# Patient Record
Sex: MALE | Race: WHITE
[De-identification: names, ages, dates, MRNs, and addresses within clinical notes are randomized per-mention and may not be internally consistent; named-entity substitution may affect disease eponyms.]

---

## 2018-01-04 ENCOUNTER — HOSPITAL ENCOUNTER (EMERGENCY)
Dept: HOSPITAL 45 - C.EDB | Age: 43
LOS: 1 days | Discharge: HOME | End: 2018-01-05
Payer: COMMERCIAL

## 2018-01-04 VITALS
WEIGHT: 246.48 LBS | WEIGHT: 246.48 LBS | BODY MASS INDEX: 35.29 KG/M2 | TEMPERATURE: 98.42 F | BODY MASS INDEX: 35.29 KG/M2 | OXYGEN SATURATION: 94 % | HEIGHT: 70 IN | HEIGHT: 70 IN

## 2018-01-04 DIAGNOSIS — R55: Primary | ICD-10-CM

## 2018-01-04 DIAGNOSIS — Z79.899: ICD-10-CM

## 2018-01-05 VITALS — SYSTOLIC BLOOD PRESSURE: 124 MMHG | HEART RATE: 74 BPM | DIASTOLIC BLOOD PRESSURE: 74 MMHG | OXYGEN SATURATION: 94 %

## 2018-01-05 LAB
ALBUMIN SERPL-MCNC: 4.2 GM/DL (ref 3.4–5)
ALP SERPL-CCNC: 111 U/L (ref 45–117)
ALT SERPL-CCNC: 62 U/L (ref 12–78)
AST SERPL-CCNC: 26 U/L (ref 15–37)
BASOPHILS # BLD: 0.03 K/UL (ref 0–0.2)
BASOPHILS NFR BLD: 0.2 %
BUN SERPL-MCNC: 15 MG/DL (ref 7–18)
CALCIUM SERPL-MCNC: 9.1 MG/DL (ref 8.5–10.1)
CO2 SERPL-SCNC: 25 MMOL/L (ref 21–32)
CREAT SERPL-MCNC: 1.38 MG/DL (ref 0.6–1.4)
EOS ABS #: 0.15 K/UL (ref 0–0.5)
EOSINOPHIL NFR BLD AUTO: 290 K/UL (ref 130–400)
GLUCOSE SERPL-MCNC: 148 MG/DL (ref 70–99)
HCT VFR BLD CALC: 44 % (ref 42–52)
HGB BLD-MCNC: 15.2 G/DL (ref 14–18)
IG#: 0.06 K/UL (ref 0–0.02)
IMM GRANULOCYTES NFR BLD AUTO: 16.4 %
LYMPHOCYTES # BLD: 2.26 K/UL (ref 1.2–3.4)
MCH RBC QN AUTO: 30.5 PG (ref 25–34)
MCHC RBC AUTO-ENTMCNC: 34.5 G/DL (ref 32–36)
MCV RBC AUTO: 88.2 FL (ref 80–100)
MONO ABS #: 1.25 K/UL (ref 0.11–0.59)
MONOCYTES NFR BLD: 9.1 %
NEUT ABS #: 10 K/UL (ref 1.4–6.5)
NEUTROPHILS # BLD AUTO: 1.1 %
NEUTROPHILS NFR BLD AUTO: 72.8 %
PMV BLD AUTO: 9.6 FL (ref 7.4–10.4)
POTASSIUM SERPL-SCNC: 3.8 MMOL/L (ref 3.5–5.1)
PROT SERPL-MCNC: 7.9 GM/DL (ref 6.4–8.2)
RED CELL DISTRIBUTION WIDTH CV: 13.2 % (ref 11.5–14.5)
RED CELL DISTRIBUTION WIDTH SD: 42.7 FL (ref 36.4–46.3)
SODIUM SERPL-SCNC: 135 MMOL/L (ref 136–145)
WBC # BLD AUTO: 13.75 K/UL (ref 4.8–10.8)

## 2018-01-05 NOTE — EMERGENCY ROOM VISIT NOTE
History


First contact with patient:  23:37


Chief Complaint:  SYNCOPE


Stated Complaint:  SEIZURE


Nursing Triage Summary:  


Pt arrived via [facility] from home. Pt reports he started Lisinopril 20mg today 


and took his first dose at 5pm. Pt was taken off of medication in October and 


started back on medication today due to high blood pressure at PCP today. 


Tonight pt had near syncope episode while on the toilet. Felt warm, diaphoretic

, 


pale per report. Pt laid down flat and symptoms resolved. Pt's mother (a nurse) 


arrived at the pt's residence and pt sat upright in chair. Mother assessed 

blood 


pressure which read 94/62. Pt again became pale, diaphoretic, and felt "ill and 


weak". Pt had syncopal episode and family lowered pt to floor where he was 


unconscious for approximately 2min. Pt's mother shook pt and he woke alert and 


oriented. EMS arrived to find pt alert and oriented. IV access obtained in LAC. 


No medications enroute.





History of Present Illness


The patient is a 42 year old male who presents to the Emergency Room via EMS 

with complaints of a syncopal episode.  The patient reports that he was sitting 

on the toilet and began to feel clammy and lightheaded and felt like he was 

going to pass out.  The patient felt better when he laid down.  His wife called 

his mother, who lives near them and is a nurse.  She reports that she took his 

blood pressure and it was 94/62.  She states the patient was very pale.  He 

then sat up in a chair when he began to feel better, however he then developed 

feelings of clamminess and lightheadedness and had a syncopal episode.  The 

patient's mother reports that she shook him and said his name, and when he did 

not respond she immediately started performing chest compressions.  She 

performed 1 compression and the patient sat up and asked what she was doing.  

The patient was alert immediately afterward.  He reports that he feels fine now 

and denies any complaints.  He was started on lisinopril today, but states that 

he did take this a few months ago and had no problems with it.  He has been 

eating and drinking normally.  He denies any recent illness.  He denies any 

chest pain or shortness of breath associated with the episode.  There was no 

seizure-like activity.  The patient denies any history of syncopal episodes.  

He denies any medical problems other than hypertension.





Review of Systems


A complete 10 point review of systems was reviewed with the patient with 

pertinent positives and negatives as per history of present illness. All else 

were negative.





Social History


Smoking Status:  Never Smoker


Marital Status:  


Housing Status:  lives with family





Current/Historical Medications


Scheduled


Lisinopril (Prinivil), 20 MG PO DAILY





Physical Exam


Vital Signs











  Date Time  Temp Pulse Resp B/P (MAP) Pulse Ox O2 Delivery O2 Flow Rate FiO2


 


1/5/18 01:41  74 16 124/74 94 Room Air  


 


1/5/18 01:31    108/67    


 


1/5/18 01:19    133/84    


 


1/5/18 01:17    123/84    


 


1/5/18 01:16    124/69    


 


1/5/18 01:15  71 18 124/69 93 Room Air  





  80  123/84    





  85  133/84    


 


1/5/18 01:11  81 17  94   


 


1/5/18 01:01    125/76    


 


1/5/18 00:41  84 17  92   


 


1/5/18 00:31    132/74    


 


1/5/18 00:11  79 15  95   


 


1/5/18 00:06  78 16  95   


 


1/5/18 00:01    127/70    


 


1/4/18 23:51  86 18  93   


 


1/4/18 23:35  87      


 


1/4/18 23:31    144/78    


 


1/4/18 23:24    143/89    


 


1/4/18 23:21 36.9 80 18 144/78 93 Room Air  


 


1/4/18 23:21     94 Room Air  











Physical Exam


VITALS: Vitals are noted on the nurse's note and reviewed by myself.  Vital 

signs stable.


GENERAL: This is a 42-year-old male, in no acute distress, nondiaphoretic, well-

developed well-nourished.


SKIN: The skin was without rashes, erythema, edema, or bruising. 


HEAD: Normocephalic atraumatic.  


EARS: External auditory canals clear, tympanic membranes pearly gray without 

erythema or effusion bilaterally.


EYES: Pupils equal round and reactive to light and accommodation.  Extraocular 

movements intact.  


MOUTH: Mucous membranes moist.  Tonsils are not enlarged.  Pharynx without 

erythema or exudate.


NECK: Supple without nuchal rigidity.  No lymphadenopathy.  


HEART: Regular rate and rhythm without murmurs gallops or rubs.


LUNGS: Clear to auscultation bilaterally without wheezes, rales or rhonchi.  


NEURO: Patient was alert and oriented to person place and time.





Medical Decision & Procedures


ER Provider


Diagnostic Interpretation:


CHEST 1 VIEW:





No acute cardiopulmonary disease.





Laboratory Results


1/4/18 23:25








Red Blood Count 4.99, Mean Corpuscular Volume 88.2, Mean Corpuscular Hemoglobin 

30.5, Mean Corpuscular Hemoglobin Concent 34.5, Mean Platelet Volume 9.6, 

Neutrophils (%) (Auto) 72.8, Lymphocytes (%) (Auto) 16.4, Monocytes (%) (Auto) 

9.1, Eosinophils (%) (Auto) 1.1, Basophils (%) (Auto) 0.2, Neutrophils # (Auto) 

10.00, Lymphocytes # (Auto) 2.26, Monocytes # (Auto) 1.25, Eosinophils # (Auto) 

0.15, Basophils # (Auto) 0.03





1/4/18 23:25

















Test


  1/4/18


00:50 1/4/18


23:25


 


Urine Color YELLOW  


 


Urine Appearance CLEAR (CLEAR)  


 


Urine pH 6.5 (4.5-7.5)  


 


Urine Specific Gravity


  1.012


(1.000-1.030) 


 


 


Urine Protein TRACE (NEG)  


 


Urine Glucose (UA) NEG (NEG)  


 


Urine Ketones NEG (NEG)  


 


Urine Occult Blood NEG (NEG)  


 


Urine Nitrite NEG (NEG)  


 


Urine Bilirubin NEG (NEG)  


 


Urine Urobilinogen NEG (NEG)  


 


Urine Leukocyte Esterase NEG (NEG)  


 


Urine WBC (Auto) 1-5 /hpf (0-5)  


 


Urine RBC (Auto) 0-4 /hpf (0-4)  


 


Urine Hyaline Casts (Auto)


  5-10 /lpf


(0-5) 


 


 


Urine Epithelial Cells (Auto)


  20-30 /lpf


(0-5) 


 


 


Urine Bacteria (Auto) NEG (NEG)  


 


White Blood Count


  


  13.75 K/uL


(4.8-10.8)


 


Red Blood Count


  


  4.99 M/uL


(4.7-6.1)


 


Hemoglobin


  


  15.2 g/dL


(14.0-18.0)


 


Hematocrit  44.0 % (42-52) 


 


Mean Corpuscular Volume


  


  88.2 fL


()


 


Mean Corpuscular Hemoglobin


  


  30.5 pg


(25-34)


 


Mean Corpuscular Hemoglobin


Concent 


  34.5 g/dl


(32-36)


 


Platelet Count


  


  290 K/uL


(130-400)


 


Mean Platelet Volume


  


  9.6 fL


(7.4-10.4)


 


Neutrophils (%) (Auto)  72.8 % 


 


Lymphocytes (%) (Auto)  16.4 % 


 


Monocytes (%) (Auto)  9.1 % 


 


Eosinophils (%) (Auto)  1.1 % 


 


Basophils (%) (Auto)  0.2 % 


 


Neutrophils # (Auto)


  


  10.00 K/uL


(1.4-6.5)


 


Lymphocytes # (Auto)


  


  2.26 K/uL


(1.2-3.4)


 


Monocytes # (Auto)


  


  1.25 K/uL


(0.11-0.59)


 


Eosinophils # (Auto)


  


  0.15 K/uL


(0-0.5)


 


Basophils # (Auto)


  


  0.03 K/uL


(0-0.2)


 


RDW Standard Deviation


  


  42.7 fL


(36.4-46.3)


 


RDW Coefficient of Variation


  


  13.2 %


(11.5-14.5)


 


Immature Granulocyte % (Auto)  0.4 % 


 


Immature Granulocyte # (Auto)


  


  0.06 K/uL


(0.00-0.02)


 


Anion Gap


  


  9.0 mmol/L


(3-11)


 


Est Creatinine Clear Calc


Drug Dose 


  88.2 ml/min 


 


 


Estimated GFR (


American) 


  73.1 


 


 


Estimated GFR (Non-


American 


  63.1 


 


 


BUN/Creatinine Ratio  11.0 (10-20) 


 


Calcium Level


  


  9.1 mg/dl


(8.5-10.1)


 


Total Bilirubin


  


  0.7 mg/dl


(0.2-1)


 


Aspartate Amino Transf


(AST/SGOT) 


  26 U/L (15-37) 


 


 


Alanine Aminotransferase


(ALT/SGPT) 


  62 U/L (12-78) 


 


 


Alkaline Phosphatase


  


  111 U/L


()


 


Troponin I


  


  < 0.015 ng/ml


(0-0.045)


 


Total Protein


  


  7.9 gm/dl


(6.4-8.2)


 


Albumin


  


  4.2 gm/dl


(3.4-5.0)


 


Globulin


  


  3.7 gm/dl


(2.5-4.0)


 


Albumin/Globulin Ratio  1.1 (0.9-2) 


 


Thyroid Stimulating Hormone


(TSH) 


  9.560 uIu/ml


(0.300-4.500)











ECG


Indication:  syncope


Rate (beats per minute):  78


Rhythm:  normal sinus


Findings:  no acute ischemic change, no ectopy


Comparison ECG Date:  no prior available





Medical Decision


Differential diagnosis includes vasovagal syncope, cardiac arrhythmia, seizure, 

dehydration, among others.





The patient is a 42-year-old male who presents today complaining of with a 

syncopal episode.  Patient is asymptomatic at this time.  Labs revealed a 

leukocytosis possibly due to stress.  TSH is elevated, patient may follow up 

with his primary care provider regarding this.  EKG was unremarkable.  Troponin 

was not elevated.  Orthostatics were not positive.  Patient likely experienced 

a vasovagal syncopal episode.  Will follow-up with his primary care provider 

for further evaluation.





The patient's case was reviewed with Dr. Acosta, ED attending physician, who 

agreed with my assessment and treatment plan.





Based on the patient's presentation and work up, I feel the patient is stable 

for outpatient treatment.  The patient was educated to return to the emergency 

department for any worsening of their current condition or new/concerning 

symptoms.  He will follow up with his PCP.





Medication Reconcilliation


Current Medication List:  was personally reviewed by me





Blood Pressure Screening


Patient's blood pressure:  Normal blood pressure





Impression





 Primary Impression:  


 Syncope





Departure Information


Dispostion


Home / Self-Care





Condition


GOOD





Referrals


Jesu Martin PA-C (PCP)





Patient Instructions


My [facility]





Additional Instructions





Rest and make sure to drink plenty of fluids at home.





Contact your primary care provider tomorrow to schedule a follow-up appointment 

and discuss the lisinopril.





Your TSH was elevated today.  Follow up with your PCP regarding this.





Return to the emergency department with any chest pain, shortness of breath, 

recurrent episodes of passing out or other new/concerning symptoms.





Problem Qualifiers








 Primary Impression:  


 Syncope


 Syncope type:  vasovagal syncope  Qualified Codes:  R55 - Syncope and collapse

## 2018-01-05 NOTE — DIAGNOSTIC IMAGING REPORT
CHEST ONE VIEW PORTABLE



CLINICAL HISTORY: syncope dyspnea



COMPARISON STUDY:  No previous studies for comparison.



FINDINGS: The bones soft tissues and hemidiaphragms are normal. The

cardiomediastinal silhouette is normal. The lungs are clear. The pulmonary

vasculature is normal. 



IMPRESSION:  Negative chest. 











The above report was generated using voice recognition software.  It may contain

grammatical, syntax or spelling errors.









Electronically signed by:  Lawson Zapien M.D.

1/5/2018 6:22 AM



Dictated Date/Time:  1/5/2018 6:22 AM

## 2018-03-04 ENCOUNTER — HOSPITAL ENCOUNTER (EMERGENCY)
Dept: HOSPITAL 45 - C.EDB | Age: 43
Discharge: HOME | End: 2018-03-04
Payer: COMMERCIAL

## 2018-03-04 VITALS — DIASTOLIC BLOOD PRESSURE: 59 MMHG | OXYGEN SATURATION: 95 % | SYSTOLIC BLOOD PRESSURE: 100 MMHG | HEART RATE: 86 BPM

## 2018-03-04 VITALS
BODY MASS INDEX: 35.35 KG/M2 | BODY MASS INDEX: 35.35 KG/M2 | WEIGHT: 246.92 LBS | WEIGHT: 246.92 LBS | HEIGHT: 70 IN | HEIGHT: 70 IN

## 2018-03-04 VITALS — TEMPERATURE: 98.24 F

## 2018-03-04 DIAGNOSIS — S39.012A: Primary | ICD-10-CM

## 2018-03-04 DIAGNOSIS — Z79.899: ICD-10-CM

## 2018-03-04 DIAGNOSIS — Z80.9: ICD-10-CM
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
## 2018-03-04 DIAGNOSIS — Z82.49: ICD-10-CM

## 2018-03-04 DIAGNOSIS — Z83.3: ICD-10-CM
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
## 2018-03-04 DIAGNOSIS — I10: ICD-10-CM

## 2018-03-04 NOTE — EMERGENCY ROOM VISIT NOTE
History


First contact with patient:  12:08


Chief Complaint:  BACK PAIN


Stated Complaint:  LOW BACK PAIN





History of Present Illness


The patient is a 42 year old male who presents to the Emergency Room with 

complaints of lower back pain with occasional sharp pain radiating into the 

right thigh.  The patient reports that he injured his back yesterday after 

bending over to  his 40 pound dog.  When he went to stand up, he 

developed sudden pain.  The patient denies any additional paresthesias or 

numbness of the right lower extremity.  He also denies any bladder or bowel 

incontinence, saddle anesthesias or weakness of the right lower extremity.  

Patient reports that he did suffer a back injury approximately 8 years ago with 

an MRI showing 3 bulging disks.  The patient currently rates his discomfort a 

10 out of 10, with pain exacerbated with attempted ambulation or movement.





Review of Systems


10 system review was performed and was negative except for pertinent positives 

and negatives as indicated in history of present illness





Past Medical/Surgical History


Medical Problems:


(1) Hypertension


Surgical Problems:


(1) History of shoulder surgery








Family History





FH: cancer


FH: diabetes mellitus


FH: heart disease


FH: hypertension





Social History


Smoking Status:  Never Smoker


Alcohol Use:  occasionally


Marital Status:  


Housing Status:  lives with family


Occupation Status:  employed





Current/Historical Medications


Scheduled


Levothyroxine Sodium (Levothyroxine Sodium), 50 MCG PO DAILY


Lisinopril (Prinivil), 20 MG PO DAILY





Scheduled PRN


Acetaminophen (Tylenol), 1,500 MG PO UD PRN for Pain


Cyclobenzaprine Hcl (Flexeril), 10 MG PO TID PRN for spasm


Oxycodone Ir (Roxicodone Ir), 1-2 TAB PO Q4H PRN for Pain





Physical Exam


Vital Signs











  Date Time  Temp Pulse Resp B/P (MAP) Pulse Ox O2 Delivery O2 Flow Rate FiO2


 


3/4/18 14:47  86 16 100/59 95   


 


3/4/18 12:01 36.8 86 18 138/87 96 Room Air  











Physical Exam


CONSTITUTIONAL:  Healthy and well nourished.  Alert and oriented X 3 with 

positive affect.  Patient appears in moderate discomfort from pain, and is 

sitting in a wheelchair.


HEENT:  Normocephalic, atraumatic.  Pupils equal, round and reactive.  


NECK:  Full active range of motion without discomfort.


RESPIRATORY:  Clear to auscultation bilaterally with no wheezing, crackles, 

rhonchi or stridor.


CARDIOVASCULAR:  Regular rate and rhythm with no murmurs, rubs or gallops.


GASTROINTESTINAL:  Bowel sounds present in all quadrants.  Soft and nontender 

to palpation.


MUSCULOSKELETAL: Examination shows tenderness to palpation of bilateral lumbar 

paraspinous muscles, left worse than right with mild left paraspinous rigidity.

  He has mild tenderness through the left SI joint.  Negative logroll and 

negative sitting straight leg raise bilaterally.


INTEGUMENTARY:  No rash or other significant dermatologic conditions noted.


NEUROLOGIC:  No focal neurologic deficits noted.  Lower extremities are sensory 

intact.





Medical Decision & Procedures


ER Provider


Diagnostic Interpretation:


My interpretation of lumbar spine x-rays does not show any obvious fractures or 

spondylolisthesis.  Mild lower degenerative disc disease is noted.  Radiologist 

also makes mention of a nonobstructing right renal calculus.  Radiologist 

report is as follows:





LUMBAR SPINE 5 VIEWS





CLINICAL HISTORY: Low back pain.





FINDINGS: 5 views of the lumbar spine are  obtained. No prior studies are


available for comparison at the time of dictation.  The skeletal structures are


well mineralized. There is no radiographic evidence of fracture or malalignment.


Vertebral body height and alignment are maintained. The transverse and spinous


processes are intact. Tiny anterior osteophytes are seen throughout. There is no


evidence of spondylolysis. There is moderate disc space narrowing at L5-S1. The


remaining intervertebral disc spaces are well-maintained. The visualized bony


pelvis appears intact. There is a nonobstructed abdominal bowel gas pattern.


There is a nonobstructing right renal calculus.





IMPRESSION:





1. No acute bony abnormality is seen involving the lumbosacral spine.





2. Nonobstructing right renal calculus.





Medications Administered











 Medications


  (Trade)  Dose


 Ordered  Sig/Rashawn


 Route  Start Time


 Stop Time Status Last Admin


Dose Admin


 


 Morphine Sulfate


  (MoRPHine


 SULFATE INJ)  10 mg  NOW  STAT


 IM  3/4/18 12:21


 3/4/18 12:23 DC 3/4/18 12:44


10 MG


 


 Ketorolac


 Tromethamine


  (Toradol Inj)  60 mg  NOW  STAT


 IM  3/4/18 12:21


 3/4/18 12:23 DC 3/4/18 12:43


60 MG


 


 Ondansetron HCl


  (Zofran Odt)  4 mg  NOW  STAT


 PO  3/4/18 12:21


 3/4/18 12:23 DC 3/4/18 12:43


4 MG


 


 Hydromorphone HCl


  (Dilaudid Inj)  1 mg  ONE  STAT


 IM  3/4/18 13:41


 3/4/18 13:42 DC 3/4/18 13:49


1 MG











ED Course


Patient history and physical exam were performed.  Nurse's notes were reviewed.

  Vital signs were reviewed and were normal.  I spent approximately 15 minutes 

and patient education regarding back injuries.  I explained that the risk for 

fracture or disc injury is low given the mechanism of injury.  I explained that 

the symptoms are likely secondary to muscular spasm with resulting mild lumbar 

radiculitis.  I explained that imaging studies at this point would likely not 

show any acute findings, nor would it change plan of care.  The patient was 

administered IM morphine and Toradol for pain, along with Zofran ODT to prevent 

nausea.  Approximately 20 minutes after medication administration, the patient'

s nurse advised that the patient's mother, who is a nurse here at our facility, 

was requesting imaging studies of the back.  X-rays of the lumbar spine were 

performed and were normal as expected.  Upon reevaluation, the patient was 

still rating his discomfort as 7 out of 10.  The patient was administered 

Dilaudid 1 mg IM.  After an additional 40 minutes observation, the patient was 

able to stand and ambulate.  He felt well enough for discharge home.





The patient was encouraged to avoid heavy lifting or sitting for long periods 

of time.  He was encouraged to initially apply ice to the back, then moist heat 

as needed.  He may also alternate these modalities.  He was encouraged 

alternate ibuprofen and Tylenol for baseline pain relief.  The patient will be 

provided prescriptions for Flexeril and OxyIR 5 mg.  No drinking or driving 

while taking these medications.  He was instructed to follow-up with his PCP 

for recheck within the next 2-3 days, especially if symptoms persist.  He was 

instructed to return to the emergency department for any progressively 

worsening pain, developing lower extremity weakness/foot drop, saddle 

anesthesias or bladder/bowel incontinence.  The patient voiced understanding of 

all discharge instructions, and rated his pain a 4 out of 10 at the conclusion 

of my exam.





Medical Decision


History and clinical exam findings are not consistent with cauda equina 

syndrome.  X-rays do not show any evidence for fracture.  The patient does not 

have mechanism of injury for disc injury, but acute discitis or herniation is 

also certainly possible.  This could be further assessed with a precertified 

MRI as needed.





[place] Drug Monitoring Program


Search Results:  patient reviewed within database, no issues identified





Medication Reconcilliation


Current Medication List:  was personally reviewed by me





Blood Pressure Screening


Patient's blood pressure:  Normal blood pressure





Impression





 Primary Impression:  


 Acute lumbar myofascial strain





Departure Information


Prescriptions





Oxycodone Ir (Roxicodone Ir) 5 Mg Tab


1-2 TAB PO Q4H Y for Pain, #15 TAB


   For Initial Treatment


   Prov: Shahzad Alegre PA         3/4/18 


Cyclobenzaprine Hcl (FLEXERIL) 10 Mg Tab


10 MG PO TID Y for spasm, #15 TAB


   Prov: Shahzad Alegre PA         3/4/18





Referrals


Jesu Martin PA-C (PCP)





Patient Instructions


[facility]





Problem Qualifiers








 Primary Impression:  


 Acute lumbar myofascial strain


 Encounter type:  initial encounter  Qualified Codes:  S39.012A - Strain of 

muscle, fascia and tendon of lower back, initial encounter

## 2018-03-04 NOTE — DIAGNOSTIC IMAGING REPORT
LUMBAR SPINE 5 VIEWS



CLINICAL HISTORY: Low back pain.



FINDINGS: 5 views of the lumbar spine are  obtained. No prior studies are

available for comparison at the time of dictation.  The skeletal structures are

well mineralized. There is no radiographic evidence of fracture or malalignment.

Vertebral body height and alignment are maintained. The transverse and spinous

processes are intact. Tiny anterior osteophytes are seen throughout. There is no

evidence of spondylolysis. There is moderate disc space narrowing at L5-S1. The

remaining intervertebral disc spaces are well-maintained. The visualized bony

pelvis appears intact. There is a nonobstructed abdominal bowel gas pattern.

There is a nonobstructing right renal calculus.



IMPRESSION:



1. No acute bony abnormality is seen involving the lumbosacral spine.



2. Nonobstructing right renal calculus.







Electronically signed by:  iRo Herrera M.D.

3/4/2018 1:25 PM



Dictated Date/Time:  3/4/2018 1:24 PM